# Patient Record
Sex: FEMALE
[De-identification: names, ages, dates, MRNs, and addresses within clinical notes are randomized per-mention and may not be internally consistent; named-entity substitution may affect disease eponyms.]

---

## 2018-08-12 ENCOUNTER — HOSPITAL ENCOUNTER (EMERGENCY)
Dept: HOSPITAL 97 - ER | Age: 23
Discharge: HOME | End: 2018-08-12
Payer: SELF-PAY

## 2018-08-12 DIAGNOSIS — Z91.048: ICD-10-CM

## 2018-08-12 DIAGNOSIS — R19.7: Primary | ICD-10-CM

## 2018-08-12 DIAGNOSIS — Z91.018: ICD-10-CM

## 2018-08-12 DIAGNOSIS — R53.83: ICD-10-CM

## 2018-08-12 LAB
ALBUMIN SERPL BCP-MCNC: 3.9 G/DL (ref 3.4–5)
ALP SERPL-CCNC: 52 U/L (ref 45–117)
ALT SERPL W P-5'-P-CCNC: 12 U/L (ref 12–78)
AST SERPL W P-5'-P-CCNC: 10 U/L (ref 15–37)
BUN BLD-MCNC: 11 MG/DL (ref 7–18)
GLUCOSE SERPLBLD-MCNC: 79 MG/DL (ref 74–106)
HCT VFR BLD CALC: 37.8 % (ref 36–45)
LYMPHOCYTES # SPEC AUTO: 2.1 K/UL (ref 0.7–4.9)
MAGNESIUM SERPL-MCNC: 2.2 MG/DL (ref 1.8–2.4)
MCH RBC QN AUTO: 27 PG (ref 27–35)
MCV RBC: 82.5 FL (ref 80–100)
PMV BLD: 9.1 FL (ref 7.6–11.3)
POTASSIUM SERPL-SCNC: 4.1 MMOL/L (ref 3.5–5.1)
RBC # BLD: 4.59 M/UL (ref 3.86–4.86)
UA COMPLETE W REFLEX CULTURE PNL UR: (no result)

## 2018-08-12 PROCEDURE — 87088 URINE BACTERIA CULTURE: CPT

## 2018-08-12 PROCEDURE — 87086 URINE CULTURE/COLONY COUNT: CPT

## 2018-08-12 PROCEDURE — 99284 EMERGENCY DEPT VISIT MOD MDM: CPT

## 2018-08-12 PROCEDURE — 81003 URINALYSIS AUTO W/O SCOPE: CPT

## 2018-08-12 PROCEDURE — 85025 COMPLETE CBC W/AUTO DIFF WBC: CPT

## 2018-08-12 PROCEDURE — 80048 BASIC METABOLIC PNL TOTAL CA: CPT

## 2018-08-12 PROCEDURE — 93005 ELECTROCARDIOGRAM TRACING: CPT

## 2018-08-12 PROCEDURE — 36415 COLL VENOUS BLD VENIPUNCTURE: CPT

## 2018-08-12 PROCEDURE — 81015 MICROSCOPIC EXAM OF URINE: CPT

## 2018-08-12 PROCEDURE — 83735 ASSAY OF MAGNESIUM: CPT

## 2018-08-12 PROCEDURE — 81025 URINE PREGNANCY TEST: CPT

## 2018-08-12 PROCEDURE — 80076 HEPATIC FUNCTION PANEL: CPT

## 2018-08-12 NOTE — ER
Nurse's Notes                                                                                     

 [facility]                                                                

Name: Tammi Lombardi                                                                              

Age: 22 yrs                                                                                       

Sex: Female                                                                                       

: 1995                                                                                   

MRN: [id number]                                                                                   

Arrival Date: 2018                                                                          

Time: 14:56                                                                                       

Account#: [id number]                                                                            

Bed 30                                                                                            

Private MD: Out, [facility], [facility]                                                                    

Diagnosis: Other fatigue;Dizziness;Diarrhea, unspecified                                          

                                                                                                  

Presentation:                                                                                     

                                                                                             

14:57 Presenting complaint: Patient states: "I've been feeling fatigued, and I also get       aj1 

      consistent nose bleeds and I have anemia. The last time I got checked was a month ago       

      and I was at like a 9" Patient reports that she came in today because she is feeling        

      more fatigued than usual and is worried that her anemia may have gotten worse. Patient      

      also reports nausea and diarrhea. Denies vomiting. Transition of care: patient was not      

      received from another setting of care. Onset of symptoms was 2018. Risk          

      Assessment: Do you want to hurt yourself or someone else? Patient reports no desire to      

      harm self or others. Initial Sepsis Screen: Does the patient meet any 2 criteria? No.       

      Patient's initial sepsis screen is negative. Does the patient have a suspected source       

      of infection? No. Patient's initial sepsis screen is negative. Care prior to arrival:       

      None.                                                                                       

14:57 Method Of Arrival: Ambulatory                                                           aj1 

14:57 Acuity: ERIC 3                                                                           aj1 

                                                                                                  

Triage Assessment:                                                                                

15:03 General: Appears in no apparent distress. comfortable, Behavior is calm, cooperative,   aj1 

      appropriate for age. Pain: Denies pain. Neuro: Level of Consciousness is awake, alert,      

      obeys commands, Speech is normal, Reports dizziness. Cardiovascular: Reports fatigue,       

      Patient's skin is warm and dry. Respiratory: Airway is patent Respiratory effort is         

      even, unlabored, Respiratory pattern is regular, symmetrical.                               

                                                                                                  

OB/GYN:                                                                                           

15:03 LMP 2018                                                                           aj1 

                                                                                                  

Historical:                                                                                       

- Allergies:                                                                                      

15:03 pecans (Rash);                                                                          aj1 

15:03 Iodine (Hives);                                                                         aj1 

- Home Meds:                                                                                      

15:03 None [Active];                                                                          aj1 

- PMHx:                                                                                           

15:03 Anemia;                                                                                 aj1 

- PSHx:                                                                                           

15:03 gastric sleeve;                                                                         aj1 

                                                                                                  

- Immunization history:: Flu vaccine is not up to date.                                           

- Social history:: Smoking status: Patient/guardian denies using tobacco.                         

- Ebola Screening: : Patient denies travel to an Ebola-affected area in the 21 days               

  before illness onset.                                                                           

                                                                                                  

                                                                                                  

Screening:                                                                                        

15:49 Abuse screen: Denies threats or abuse. Denies injuries from another. Nutritional        rv  

      screening: No deficits noted. Tuberculosis screening: No symptoms or risk factors           

      identified. Fall Risk None identified.                                                      

                                                                                                  

Assessment:                                                                                       

15:30 General: Appears in no apparent distress. comfortable, Behavior is calm, cooperative.   rv  

15:30 Pain: Denies pain. Neuro: Level of Consciousness is awake, alert, obeys commands,       rv  

      Oriented to person, place, time, situation. Cardiovascular: Capillary refill < 3            

      seconds. Respiratory: Airway is patent. GI: No signs and/or symptoms were reported          

      involving the gastrointestinal system. : No signs and/or symptoms were reported           

      regarding the genitourinary system. EENT: No signs and/or symptoms were reported            

      regarding the EENT system. Derm: Skin is intact.                                            

                                                                                                  

Vital Signs:                                                                                      

15:03  / 80; Pulse 81; Resp 18; Temp 98.7(O); Pulse Ox 100% on R/A; Weight 74.84 kg     aj1 

      (R); Height 5 ft. 3 in. (160.02 cm) (R); Pain 0/10;                                         

15:51  / 68 Supine; Pulse 84;                                                           rv  

15:51  / 74 Sitting; Pulse 108;                                                         rv  

15:51  / 68 Standing; Pulse 107;                                                        rv  

17:13 BP 98 / 76; Pulse 73; Pulse Ox 100% on R/A;                                             rv  

15:03 Body Mass Index 29.23 (74.84 kg, 160.02 cm)                                             aj1 

                                                                                                  

ED Course:                                                                                        

14:56 Patient arrived in ED.                                                                  sb2 

14:56 Out, [facility] is Private Physician.                                                      sb2 

15:01 Triage completed.                                                                       aj1 

15:03 Arm band placed on Patient placed in an exam room.                                      aj1 

15:05 Rancho Carlson PA is PHCP.                                                                cp  

15:05 Gerson De León MD is Attending Physician.                                                cp  

15:30 Inserted saline lock: 20 gauge in right forearm, using aseptic technique.               rv  

15:49 Patient has correct armband on for positive identification. Bed in low position. Call   rv  

      light in reach. Side rails up X 1. Adult w/ patient. Pulse ox on. NIBP on.                  

17:19 IV discontinued, bleeding controlled, No redness/swelling at site. Pressure dressing    rv  

      applied.                                                                                    

17:19 No provider procedures requiring assistance completed.                                  rv  

                                                                                                  

Administered Medications:                                                                         

15:30 Drug: Zofran 4 mg Route: PO;                                                            rv  

16:28 Follow up: Response: No adverse reaction                                                rv  

16:30 Drug: NS 0.9% 1000 ml Route: IV; Rate: 1 bolus; Site: right antecubital;                rv  

                                                                                                  

                                                                                                  

Outcome:                                                                                          

16:59 Discharge ordered by MD.                                                                sunny  

17:19 Discharged to home ambulatory.                                                          rv  

17:19 Condition: good                                                                             

17:19 Discharge instructions given to patient, Instructed on discharge instructions.              

17:19 Patient left the ED.                                                                    rv  

                                                                                                  

Signatures:                                                                                       

Rita Melendez, RN                     RN   aj1                                                  

Rancho Carlson PA                         PA   Vashti Morris                               sb2                                                  

Hayder Palmer RN                    RN   rv                                                   

                                                                                                  

**************************************************************************************************

## 2018-08-12 NOTE — EDPHYS
Physician Documentation                                                                           

 [facility]                                                                

Name: Tammi Lombardi                                                                              

Age: 22 yrs                                                                                       

Sex: Female                                                                                       

: 1995                                                                                   

MRN: [id number]                                                                                   

Arrival Date: 2018                                                                          

Time: 14:56                                                                                       

Account#: [id number]                                                                            

Bed 30                                                                                            

Private MD: Out, of [facility], [facility]                                                                    

ED Physician Gerson De León                                                                         

HPI:                                                                                              

                                                                                             

15:58 This 22 yrs old  Female presents to ER via Ambulatory with complaints of        cp  

      Weakness, Dizziness, Diarrhea.                                                              

15:58 The patient presents to the emergency department with weakness of the entire body,      cp  

      generalized weakness, that is mild, dizziness. Onset: The symptoms/episode                  

      began/occurred 2 year(s) ago, and became worse 2 day(s) ago. Associated signs and           

      symptoms: Pertinent positives: dizziness, nausea, weakness, fatigue, Pertinent              

      negatives: altered mental status, headache, paresthesias, syncope, near-syncope.            

      Severity of symptoms: in the emergency department the symptoms are unchanged despite        

      home interventions. Patient's baseline: Neuro: alert and fully oriented, Motor: no          

      deficits, Ambulation: walks without assistance, Speech: normal. Patient reports history     

      of iron deficiency anemia. Prescribed iron supplement but does not take due to causing      

      upset stomach.                                                                              

                                                                                                  

OB/GYN:                                                                                           

15:03 LMP 2018                                                                           aj1 

                                                                                                  

Historical:                                                                                       

- Allergies:                                                                                      

15:03 pecans (Rash);                                                                          aj1 

15:03 Iodine (Hives);                                                                         aj1 

- Home Meds:                                                                                      

15:03 None [Active];                                                                          aj1 

- PMHx:                                                                                           

15:03 Anemia;                                                                                 aj1 

- PSHx:                                                                                           

15:03 gastric sleeve;                                                                         aj1 

                                                                                                  

- Immunization history:: Flu vaccine is not up to date.                                           

- Social history:: Smoking status: Patient/guardian denies using tobacco.                         

- Ebola Screening: : Patient denies travel to an Ebola-affected area in the 21 days               

  before illness onset.                                                                           

                                                                                                  

                                                                                                  

ROS:                                                                                              

16:01 Eyes: Negative for injury, pain, redness, and discharge.                                cp  

16:01 Constitutional: Positive for fatigue, Negative for body aches, chills, poor PO intake,      

      weight loss.                                                                                

16:01 ENT: Negative for drainage from ear(s), ear pain, sore throat, difficulty swallowing,       

      difficulty handling secretions.                                                             

16:01 Cardiovascular: Negative for chest pain, edema, palpitations.                               

16:01 Respiratory: Negative for cough, shortness of breath, wheezing.                             

16:01 Abdomen/GI: Positive for nausea, Negative for abdominal pain, vomiting, diarrhea,           

      constipation, anorexia, black/tarry stool, rectal bleeding.                                 

16:01 Back: Negative for pain at rest, pain with movement, radiated pain.                         

16:01 : Negative for urinary symptoms, vaginal bleeding, menstrual abnormality.                 

16:01 Skin: Negative for cellulitis, diaphoresis, rash.                                           

16:01 Neuro: Positive for dizziness, general weakness, Negative for altered mental status,        

      headache, speech changes, syncope, near syncope.                                            

16:01 All other systems are negative.                                                             

                                                                                                  

Exam:                                                                                             

15:57 ECG was reviewed by the Attending Physician.                                            cp  

16:03 Head/Face:  Normocephalic, atraumatic. Eyes:  Pupils equal round and reactive to light, cp  

      extra-ocular motions intact.  Lids and lashes normal.  Conjunctiva and sclera are           

      non-icteric and not injected.  Cornea within normal limits.  Periorbital areas with no      

      swelling, redness, or edema. ENT:  Nares patent. No nasal discharge, no septal              

      abnormalities noted.  Tympanic membranes are normal and external auditory canals are        

      clear.  Oropharynx with no redness, swelling, or masses, exudates, or evidence of           

      obstruction, uvula midline.  Mucous membranes moist. Neck:  Trachea midline, no             

      thyromegaly or masses palpated, and no cervical lymphadenopathy.  Supple, full range of     

      motion without nuchal rigidity, or vertebral point tenderness.  No Meningismus.             

      Chest/axilla:  Normal chest wall appearance and motion.  Nontender with no deformity.       

      No lesions are appreciated.                                                                 

16:03 Constitutional: The patient appears in no acute distress, alert, awake,                     

      non-diaphoretic, non-toxic, well developed, well nourished.                                 

16:03 Cardiovascular: Rate: tachycardic, Rhythm: regular, Pulses: Pulses are 2+ in right          

      radial artery and left radial artery. Heart sounds: Edema:                                  

16:03 Respiratory: the patient does not display signs of respiratory distress,  Respirations:     

      normal, no use of accessory muscles, no retractions, no splinting, no tachypnea,            

      labored breathing, is not present, Breath sounds: are clear throughout, no decreased        

      breath sounds, no stridor, no wheezing.                                                     

16:03 Abdomen/GI: Inspection: abdomen appears normal, Bowel sounds: active, all quadrants,        

      Palpation: abdomen is soft and non-tender, in all quadrants.                                

16:03 Back: pain, is absent, ROM is normal.                                                       

16:03 Skin: cellulitis, is not appreciated, no rash present.                                      

16:03 Neuro: Orientation: to person, place \T\ time. Mentation: is normal, Cerebellar function:   

      is grossly normal, Motor: moves all fours, strength is normal, Sensation: no obvious        

      gross deficits, Gait: is steady.                                                            

                                                                                                  

Vital Signs:                                                                                      

15:03  / 80; Pulse 81; Resp 18; Temp 98.7(O); Pulse Ox 100% on R/A; Weight 74.84 kg     aj1 

      (R); Height 5 ft. 3 in. (160.02 cm) (R); Pain 0/10;                                         

15:51  / 68 Supine; Pulse 84;                                                           rv  

15:51  / 74 Sitting; Pulse 108;                                                         rv  

15:51  / 68 Standing; Pulse 107;                                                        rv  

17:13 BP 98 / 76; Pulse 73; Pulse Ox 100% on R/A;                                             rv  

15:03 Body Mass Index 29.23 (74.84 kg, 160.02 cm)                                             aj1 

                                                                                                  

MDM:                                                                                              

15:07 Patient medically screened.                                                             cp  

16:48 Data reviewed: vital signs, nurses notes, lab test result(s), EKG.                      cp  

16:48 Counseling: I had a detailed discussion with the patient and/or guardian regarding: the cp  

      historical points, exam findings, and any diagnostic results supporting the                 

      discharge/admit diagnosis, lab results, the need for outpatient follow up, a family         

      practitioner, to return to the emergency department if symptoms worsen or persist or if     

      there are any questions or concerns that arise at home. Response to treatment: the          

      patient's symptoms have mildly improved after treatment, and as a result, I will            

      discharge patient.                                                                          

                                                                                                  

                                                                                             

15:17 Order name: Basic Metabolic Panel; Complete Time: 16:45                                 cp  

                                                                                             

16:46 Interpretation: Normal except: CRE 0.50.                                                  

                                                                                             

15:17 Order name: CBC with Diff; Complete Time: 15:55                                         cp  

                                                                                             

15:55 Interpretation: Reviewed.                                                                 

                                                                                             

15:17 Order name: Creatinine for Radiology; Complete Time: 16:44                              cp  

                                                                                             

16:44 Interpretation: CRE 0.50; Reviewed.                                                       

                                                                                             

15:17 Order name: Hepatic Function; Complete Time: 16:45                                      cp  

                                                                                             

16:46 Interpretation: Normal except: AST 10.                                                  cp  

                                                                                             

15:17 Order name: Magnesium; Complete Time: 16:45                                             cp  

08/12                                                                                             

15:20 Order name: Urine Microscopic Only; Complete Time: 16:26                                rv  

                                                                                             

16:27 Interpretation: Reviewed.                                                               cp  

08                                                                                             

15:06 Order name: Orthostatics; Complete Time: 15:48                                          cp  

                                                                                             

15:06 Order name: Urine Dipstick-Ancillary (obtain specimen); Complete Time: 15:48            cp  

                                                                                             

15:17 Order name: EKG; Complete Time: 15:17                                                   cp  

                                                                                             

15:24 Order name: Urine Dipstick--Ancillary (enter results); Complete Time: 16:26             eb  

08                                                                                             

16:26 Interpretation: Normal except: UBLD TRACE; UESTR TRACE.                                 cp  

                                                                                             

15:24 Order name: Urine Pregnancy--Ancillary (enter results); Complete Time: 16:26            eb  

                                                                                             

16:02 Order name: Urine Culture                                                               EDMS

                                                                                             

15:06 Order name: Urine Pregnancy Test (obtain specimen); Complete Time: 15:48                cp  

                                                                                             

15:17 Order name: IV Saline Lock; Complete Time: 15:48                                        cp  

                                                                                             

15:17 Order name: Labs collected and sent; Complete Time: 15:48                               cp  

12                                                                                             

15:17 Order name: EKG - Nurse/Tech; Complete Time: 16:27                                      cp  

                                                                                                  

ECG:                                                                                              

15:57 Rate is 74 beats/min. Rhythm is regular. AL interval is normal. QRS interval is normal. cp  

      QT interval is normal. Interpreted by me. Reviewed by me.                                   

                                                                                                  

Administered Medications:                                                                         

15:30 Drug: Zofran 4 mg Route: PO;                                                            rv  

16:28 Follow up: Response: No adverse reaction                                                rv  

16:30 Drug: NS 0.9% 1000 ml Route: IV; Rate: 1 bolus; Site: right antecubital;                rv  

                                                                                                  

                                                                                                  

Disposition:                                                                                      

17:55 Co-signature as Attending Physician, Gerson De León MD.                                    rn  

                                                                                                  

Disposition:                                                                                      

18 16:59 Discharged to Home. Impression: Other fatigue, Dizziness, Diarrhea, unspecified.   

- Condition is Stable.                                                                            

- Discharge Instructions: Food Choices to Help Relieve Diarrhea, Adult, Diarrhea,                 

  Adult, Fatigue, Dizziness, Easy-to-Read.                                                        

                                                                                                  

- Medication Reconciliation Form, Thank You Letter, Antibiotic Education, Prescription            

  Opioid Use form.                                                                                

- Follow up: Private Physician; When: 2 - 3 days; Reason: Recheck today's complaints.             

- Problem is an ongoing problem.                                                                  

- Symptoms have improved.                                                                         

                                                                                                  

                                                                                                  

                                                                                                  

Signatures:                                                                                       

Dispatcher [employer]                           EDRita Olmstead RN                     RN   aj1                                                  

Gerson De León MD MD rn Page, Corey, PA PA cp Vicente, Ronaldo, RN                    RN   rv                                                   

                                                                                                  

Corrections: (The following items were deleted from the chart)                                    

17:19 16:59 2018 16:59 Discharged to Home. Impression: Other fatigue; Dizziness;        rv  

      Diarrhea, unspecified. Condition is Stable. Forms are Medication Reconciliation Form,       

      Thank You Letter, Antibiotic Education, Prescription Opioid Use. Follow up: Private         

      Physician; When: 2 - 3 days; Reason: Recheck today's complaints. Problem is an ongoing      

      problem. Symptoms have improved. cp                                                         

                                                                                                  

**************************************************************************************************

## 2018-08-13 NOTE — EKG
Test Date:    2018-08-12               Test Time:    15:53:46

Technician:   BERNIE                                    

                                                     

MEASUREMENT RESULTS:                                       

Intervals:                                           

Rate:         74                                     

IN:           172                                    

QRSD:         86                                     

QT:           360                                    

QTc:          399                                    

Axis:                                                

P:            59                                     

IN:           172                                    

QRS:          56                                     

T:            29                                     

                                                     

INTERPRETIVE STATEMENTS:                                       

                                                     

Normal sinus rhythm

Normal ECG

No previous ECG available for comparison



Electronically Signed On 08-13-18 05:41:35 CDT by Malachi Salazar

## 2025-06-29 ENCOUNTER — APPOINTMENT (OUTPATIENT)
Dept: INTERVENTIONAL RADIOLOGY/VASCULAR | Age: 30
End: 2025-06-29
Payer: COMMERCIAL

## 2025-06-29 ENCOUNTER — HOSPITAL ENCOUNTER (EMERGENCY)
Age: 30
Discharge: HOME | End: 2025-06-29
Attending: EMERGENCY MEDICINE
Payer: COMMERCIAL

## 2025-06-29 VITALS
RESPIRATION RATE: 17 BRPM | BODY MASS INDEX: 35.08 KG/M2 | OXYGEN SATURATION: 99 % | WEIGHT: 198 LBS | HEART RATE: 84 BPM | TEMPERATURE: 98.4 F | DIASTOLIC BLOOD PRESSURE: 69 MMHG | SYSTOLIC BLOOD PRESSURE: 133 MMHG | HEIGHT: 63 IN

## 2025-06-29 DIAGNOSIS — D50.8 OTHER IRON DEFICIENCY ANEMIA: Primary | ICD-10-CM

## 2025-06-29 LAB
ANION GAP SERPL CALC-SCNC: 11 MEQ/L (ref 8–16)
B-HCG SERPL QL: NEGATIVE
BASOPHILS ABSOLUTE: 0 THOU/MM3 (ref 0–0.1)
BASOPHILS NFR BLD AUTO: 0.6 %
BUN SERPL-MCNC: 12 MG/DL (ref 8–23)
CALCIUM SERPL-MCNC: 9 MG/DL (ref 8.6–10)
CHLORIDE SERPL-SCNC: 107 MEQ/L (ref 98–111)
CO2 SERPL-SCNC: 22 MEQ/L (ref 22–29)
CREAT SERPL-MCNC: 0.4 MG/DL (ref 0.5–0.9)
DEPRECATED RDW RBC AUTO: 42.5 FL (ref 35–45)
ECHO BSA: 2 M2
EOSINOPHIL NFR BLD AUTO: 1.1 %
EOSINOPHILS ABSOLUTE: 0.1 THOU/MM3 (ref 0–0.4)
ERYTHROCYTE [DISTWIDTH] IN BLOOD BY AUTOMATED COUNT: 16.8 % (ref 11.5–14.5)
GFR SERPL CREATININE-BSD FRML MDRD: > 90 ML/MIN/1.73M2
GLUCOSE SERPL-MCNC: 85 MG/DL (ref 74–109)
HCT VFR BLD AUTO: 28.4 % (ref 37–47)
HGB BLD-MCNC: 7.3 GM/DL (ref 12–16)
HYPOCHROMIA BLD QL SMEAR: PRESENT
IMM GRANULOCYTES # BLD AUTO: 0.02 THOU/MM3 (ref 0–0.07)
IMM GRANULOCYTES NFR BLD AUTO: 0.2 %
LYMPHOCYTES ABSOLUTE: 2.1 THOU/MM3 (ref 1–4.8)
LYMPHOCYTES NFR BLD AUTO: 25.6 %
MCH RBC QN AUTO: 18.2 PG (ref 26–33)
MCHC RBC AUTO-ENTMCNC: 25.7 GM/DL (ref 32.2–35.5)
MCV RBC AUTO: 70.8 FL (ref 81–99)
MONOCYTES ABSOLUTE: 0.4 THOU/MM3 (ref 0.4–1.3)
MONOCYTES NFR BLD AUTO: 5.2 %
NEUTROPHILS ABSOLUTE: 5.5 THOU/MM3 (ref 1.8–7.7)
NEUTROPHILS NFR BLD AUTO: 67.3 %
NRBC BLD AUTO-RTO: 0 /100 WBC
NT-PROBNP SERPL IA-MCNC: 39 PG/ML (ref 0–124)
OSMOLALITY SERPL CALC.SUM OF ELEC: 278.4 MOSMOL/KG (ref 275–300)
PLATELET # BLD AUTO: 463 THOU/MM3 (ref 130–400)
PLATELET BLD QL SMEAR: ADEQUATE
PMV BLD AUTO: 10.3 FL (ref 9.4–12.4)
POTASSIUM SERPL-SCNC: 4.3 MEQ/L (ref 3.5–5.2)
RBC # BLD AUTO: 4.01 MILL/MM3 (ref 4.2–5.4)
SCAN OF BLOOD SMEAR: NORMAL
SODIUM SERPL-SCNC: 140 MEQ/L (ref 135–145)
WBC # BLD AUTO: 8.2 THOU/MM3 (ref 4.8–10.8)

## 2025-06-29 PROCEDURE — 85025 COMPLETE CBC W/AUTO DIFF WBC: CPT

## 2025-06-29 PROCEDURE — 93971 EXTREMITY STUDY: CPT

## 2025-06-29 PROCEDURE — 99284 EMERGENCY DEPT VISIT MOD MDM: CPT

## 2025-06-29 PROCEDURE — 36415 COLL VENOUS BLD VENIPUNCTURE: CPT

## 2025-06-29 PROCEDURE — 83880 ASSAY OF NATRIURETIC PEPTIDE: CPT

## 2025-06-29 PROCEDURE — 84703 CHORIONIC GONADOTROPIN ASSAY: CPT

## 2025-06-29 PROCEDURE — 80048 BASIC METABOLIC PNL TOTAL CA: CPT

## 2025-06-29 RX ORDER — FERROUS SULFATE 325(65) MG
325 TABLET ORAL
Qty: 30 TABLET | Refills: 0 | Status: SHIPPED | OUTPATIENT
Start: 2025-06-29

## 2025-06-29 ASSESSMENT — PAIN SCALES - GENERAL: PAINLEVEL_OUTOF10: 9

## 2025-06-29 ASSESSMENT — PAIN - FUNCTIONAL ASSESSMENT: PAIN_FUNCTIONAL_ASSESSMENT: 0-10

## 2025-06-29 ASSESSMENT — PAIN DESCRIPTION - LOCATION: LOCATION: HEAD;ARM

## 2025-06-29 NOTE — ED PROVIDER NOTES
the following components:    RBC 4.01 (*)     Hemoglobin 7.3 (*)     Hematocrit 28.4 (*)     MCV 70.8 (*)     MCH 18.2 (*)     MCHC 25.7 (*)     RDW-CV 16.8 (*)     Platelets 463 (*)     All other components within normal limits   HCG, SERUM, QUALITATIVE   BRAIN NATRIURETIC PEPTIDE   ANION GAP   OSMOLALITY   GLOMERULAR FILTRATION RATE, ESTIMATED   SCAN OF BLOOD SMEAR       EMERGENCY DEPARTMENT COURSE:   Vitals:    Vitals:    06/29/25 1652   BP: 133/69   Pulse: 84   Resp: 17   Temp: 98.4 °F (36.9 °C)   TempSrc: Oral   SpO2: 99%   Weight: 89.8 kg (198 lb)   Height: 1.6 m (5' 3\")         CRITICAL CARE:       CONSULTS:  None    PROCEDURES:  none    FINAL IMPRESSION      1. Other iron deficiency anemia          DISPOSITION/PLAN   Decision To Discharge    PATIENT REFERRED TO:  Your primary care physician in [place]    In 2 weeks  Follow-up    [facility] Emergency Department  [address] [place] [address]  [phone]    If symptoms worsen      DISCHARGE MEDICATIONS:  Discharge Medication List as of 6/29/2025  7:26 PM        START taking these medications    Details   ferrous sulfate (IRON 325) 325 (65 Fe) MG tablet Take 1 tablet by mouth daily (with breakfast), Disp-30 tablet, R-0Normal             (Please note that portions of this note were completed with a voice recognition program.  Efforts were made to edit the dictations but occasionally words are mis-transcribed.)    DO Guero Boykin Seth, DO  07/01/25 [id number]